# Patient Record
Sex: FEMALE | Race: BLACK OR AFRICAN AMERICAN | NOT HISPANIC OR LATINO
[De-identification: names, ages, dates, MRNs, and addresses within clinical notes are randomized per-mention and may not be internally consistent; named-entity substitution may affect disease eponyms.]

---

## 2020-06-25 ENCOUNTER — APPOINTMENT (OUTPATIENT)
Dept: OTOLARYNGOLOGY | Facility: CLINIC | Age: 40
End: 2020-06-25
Payer: COMMERCIAL

## 2020-06-25 VITALS
TEMPERATURE: 98.3 F | SYSTOLIC BLOOD PRESSURE: 160 MMHG | WEIGHT: 132 LBS | DIASTOLIC BLOOD PRESSURE: 111 MMHG | OXYGEN SATURATION: 95 % | HEIGHT: 64 IN | HEART RATE: 120 BPM | BODY MASS INDEX: 22.53 KG/M2

## 2020-06-25 DIAGNOSIS — Z86.79 PERSONAL HISTORY OF OTHER DISEASES OF THE CIRCULATORY SYSTEM: ICD-10-CM

## 2020-06-25 DIAGNOSIS — Z83.49 FAMILY HISTORY OF OTHER ENDOCRINE, NUTRITIONAL AND METABOLIC DISEASES: ICD-10-CM

## 2020-06-25 DIAGNOSIS — Z78.9 OTHER SPECIFIED HEALTH STATUS: ICD-10-CM

## 2020-06-25 DIAGNOSIS — Z82.49 FAMILY HISTORY OF ISCHEMIC HEART DISEASE AND OTHER DISEASES OF THE CIRCULATORY SYSTEM: ICD-10-CM

## 2020-06-25 DIAGNOSIS — Z87.09 PERSONAL HISTORY OF OTHER DISEASES OF THE RESPIRATORY SYSTEM: ICD-10-CM

## 2020-06-25 DIAGNOSIS — Z86.39 PERSONAL HISTORY OF OTHER ENDOCRINE, NUTRITIONAL AND METABOLIC DISEASE: ICD-10-CM

## 2020-06-25 DIAGNOSIS — Z82.3 FAMILY HISTORY OF STROKE: ICD-10-CM

## 2020-06-25 PROBLEM — Z00.00 ENCOUNTER FOR PREVENTIVE HEALTH EXAMINATION: Status: ACTIVE | Noted: 2020-06-25

## 2020-06-25 PROCEDURE — 31575 DIAGNOSTIC LARYNGOSCOPY: CPT

## 2020-06-25 PROCEDURE — 99205 OFFICE O/P NEW HI 60 MIN: CPT | Mod: 25

## 2020-06-25 PROCEDURE — 76536 US EXAM OF HEAD AND NECK: CPT

## 2020-06-25 RX ORDER — METOPROLOL TARTRATE 75 MG/1
TABLET, FILM COATED ORAL
Refills: 0 | Status: ACTIVE | COMMUNITY

## 2020-06-25 RX ORDER — AMLODIPINE BESYLATE 5 MG/1
TABLET ORAL
Refills: 0 | Status: ACTIVE | COMMUNITY

## 2020-07-19 ENCOUNTER — LABORATORY RESULT (OUTPATIENT)
Age: 40
End: 2020-07-19

## 2020-07-21 ENCOUNTER — TRANSCRIPTION ENCOUNTER (OUTPATIENT)
Age: 40
End: 2020-07-21

## 2020-07-21 VITALS
TEMPERATURE: 98 F | HEART RATE: 88 BPM | WEIGHT: 134.04 LBS | RESPIRATION RATE: 18 BRPM | OXYGEN SATURATION: 100 % | DIASTOLIC BLOOD PRESSURE: 94 MMHG | HEIGHT: 64 IN | SYSTOLIC BLOOD PRESSURE: 142 MMHG

## 2020-07-21 RX ORDER — ACETAMINOPHEN AND CODEINE 300; 30 MG/1; MG/1
300-30 TABLET ORAL
Qty: 12 | Refills: 0 | Status: COMPLETED | COMMUNITY
Start: 2020-07-21 | End: 1900-01-01

## 2020-07-22 ENCOUNTER — APPOINTMENT (OUTPATIENT)
Dept: OTOLARYNGOLOGY | Facility: HOSPITAL | Age: 40
End: 2020-07-22

## 2020-07-22 ENCOUNTER — RESULT REVIEW (OUTPATIENT)
Age: 40
End: 2020-07-22

## 2020-07-22 ENCOUNTER — OUTPATIENT (OUTPATIENT)
Dept: INPATIENT UNIT | Facility: HOSPITAL | Age: 40
LOS: 1 days | Discharge: HOME | End: 2020-07-22
Payer: COMMERCIAL

## 2020-07-22 VITALS
SYSTOLIC BLOOD PRESSURE: 125 MMHG | DIASTOLIC BLOOD PRESSURE: 70 MMHG | OXYGEN SATURATION: 100 % | RESPIRATION RATE: 16 BRPM

## 2020-07-22 LAB
ALBUMIN SERPL ELPH-MCNC: 4.5 G/DL — SIGNIFICANT CHANGE UP (ref 3.3–5)
BASOPHILS # BLD AUTO: 0.02 K/UL — SIGNIFICANT CHANGE UP (ref 0–0.2)
BASOPHILS NFR BLD AUTO: 0.5 % — SIGNIFICANT CHANGE UP (ref 0–2)
CALCIUM SERPL-MCNC: 10.4 MG/DL — SIGNIFICANT CHANGE UP (ref 8.4–10.5)
EOSINOPHIL # BLD AUTO: 0.2 K/UL — SIGNIFICANT CHANGE UP (ref 0–0.5)
EOSINOPHIL NFR BLD AUTO: 4.6 % — SIGNIFICANT CHANGE UP (ref 0–6)
HCT VFR BLD CALC: 35.4 % — SIGNIFICANT CHANGE UP (ref 34.5–45)
HGB BLD-MCNC: 10.4 G/DL — LOW (ref 11.5–15.5)
IMM GRANULOCYTES NFR BLD AUTO: 0.2 % — SIGNIFICANT CHANGE UP (ref 0–1.5)
LYMPHOCYTES # BLD AUTO: 1.52 K/UL — SIGNIFICANT CHANGE UP (ref 1–3.3)
LYMPHOCYTES # BLD AUTO: 34.9 % — SIGNIFICANT CHANGE UP (ref 13–44)
MAGNESIUM SERPL-MCNC: 2.1 MG/DL — SIGNIFICANT CHANGE UP (ref 1.6–2.6)
MCHC RBC-ENTMCNC: 23 PG — LOW (ref 27–34)
MCHC RBC-ENTMCNC: 29.4 GM/DL — LOW (ref 32–36)
MCV RBC AUTO: 78.3 FL — LOW (ref 80–100)
MONOCYTES # BLD AUTO: 0.51 K/UL — SIGNIFICANT CHANGE UP (ref 0–0.9)
MONOCYTES NFR BLD AUTO: 11.7 % — SIGNIFICANT CHANGE UP (ref 2–14)
NEUTROPHILS # BLD AUTO: 2.09 K/UL — SIGNIFICANT CHANGE UP (ref 1.8–7.4)
NEUTROPHILS NFR BLD AUTO: 48.1 % — SIGNIFICANT CHANGE UP (ref 43–77)
NRBC # BLD: 0 /100 WBCS — SIGNIFICANT CHANGE UP (ref 0–0)
PLATELET # BLD AUTO: 326 K/UL — SIGNIFICANT CHANGE UP (ref 150–400)
PTH-INTACT IO % DIF SERPL: 112 PG/ML — HIGH (ref 8.5–72.5)
PTH-INTACT IO % DIF SERPL: 13.1 PG/ML — SIGNIFICANT CHANGE UP (ref 8.5–72.5)
PTH-INTACT IO % DIF SERPL: 24.2 PG/ML — SIGNIFICANT CHANGE UP (ref 8.5–72.5)
PTH-INTACT IO % DIF SERPL: 41.5 PG/ML — SIGNIFICANT CHANGE UP (ref 8.5–72.5)
RBC # BLD: 4.52 M/UL — SIGNIFICANT CHANGE UP (ref 3.8–5.2)
RBC # FLD: 15.6 % — HIGH (ref 10.3–14.5)
WBC # BLD: 4.35 K/UL — SIGNIFICANT CHANGE UP (ref 3.8–10.5)
WBC # FLD AUTO: 4.35 K/UL — SIGNIFICANT CHANGE UP (ref 3.8–10.5)

## 2020-07-22 PROCEDURE — 60500 EXPLORE PARATHYROID GLANDS: CPT

## 2020-07-22 PROCEDURE — 36415 COLL VENOUS BLD VENIPUNCTURE: CPT

## 2020-07-22 PROCEDURE — 88305 TISSUE EXAM BY PATHOLOGIST: CPT | Mod: 26

## 2020-07-22 PROCEDURE — 82310 ASSAY OF CALCIUM: CPT

## 2020-07-22 PROCEDURE — 83970 ASSAY OF PARATHORMONE: CPT

## 2020-07-22 PROCEDURE — 83735 ASSAY OF MAGNESIUM: CPT

## 2020-07-22 PROCEDURE — 82040 ASSAY OF SERUM ALBUMIN: CPT

## 2020-07-22 PROCEDURE — 88305 TISSUE EXAM BY PATHOLOGIST: CPT

## 2020-07-22 PROCEDURE — 85025 COMPLETE CBC W/AUTO DIFF WBC: CPT

## 2020-07-22 RX ORDER — MORPHINE SULFATE 50 MG/1
2 CAPSULE, EXTENDED RELEASE ORAL EVERY 4 HOURS
Refills: 0 | Status: DISCONTINUED | OUTPATIENT
Start: 2020-07-22 | End: 2020-07-22

## 2020-07-22 RX ORDER — SODIUM CHLORIDE 9 MG/ML
1000 INJECTION, SOLUTION INTRAVENOUS
Refills: 0 | Status: DISCONTINUED | OUTPATIENT
Start: 2020-07-22 | End: 2020-07-22

## 2020-07-22 RX ORDER — OXYCODONE HYDROCHLORIDE 5 MG/1
5 TABLET ORAL ONCE
Refills: 0 | Status: DISCONTINUED | OUTPATIENT
Start: 2020-07-22 | End: 2020-07-22

## 2020-07-22 RX ORDER — ACETAMINOPHEN 500 MG
650 TABLET ORAL ONCE
Refills: 0 | Status: DISCONTINUED | OUTPATIENT
Start: 2020-07-22 | End: 2020-07-22

## 2020-07-22 RX ORDER — ONDANSETRON 8 MG/1
4 TABLET, FILM COATED ORAL ONCE
Refills: 0 | Status: DISCONTINUED | OUTPATIENT
Start: 2020-07-22 | End: 2020-07-22

## 2020-07-22 RX ADMIN — MORPHINE SULFATE 2 MILLIGRAM(S): 50 CAPSULE, EXTENDED RELEASE ORAL at 12:25

## 2020-07-22 NOTE — PROCEDURE
[Image(s) Captured] : image(s) captured and filed [Gag Reflex] : gag reflex preventing mirror examination [Unable to Cooperate with Mirror] : patient unable to cooperate with mirror [Serial Number: ___] : Serial Number: [unfilled] [FreeTextEntry3] : \par [facility]\par THYROID/NECK ULTRASOUND REPORT\par \par NAME: ADELE KIM .Sheridan..MR# [id number]..	   : 1980...	     DATE: 2020\par \par HISTORY/ INDICATIONS: A 39-year-old female with well documented primary hyperparathyroidism, a thyroid nodule and a possible parathyroid adenoma for pre-surgical imaging.  \par \par COMPARISON: None\par \par PROCEDURE: Physician performed high-resolution ultrasound gray scale imaging and color Doppler supplementation of the thyroid gland and neck was obtained in the longitudinal and transverse planes using a 13 MHz linear transducer with image capture.  All measurements are in centimeters (longitudinal x AP x transverse).  \par \par FINDINGS: Overall the thyroid gland is normal in size,  heterogeneous in echotexture with normal vascularity on color Doppler flow. \par \par RIGHT LOBE: Is not enlarged, heterogeneous, with normal vascularity on color Doppler and measures 4.22 x 1.38 x 1.60 cm.  NODULES: Within the mid right thyroid lobe there is a smoothly marginated complex primarily isoechoic nodule with central cystic degeneration and grade 2 vascularity that is wider than tall without microcalcifications and measures 1.78 x 0.80 x 1.25 cm, previously biopsied and benign. A second smoothly marginated hypoechoic nodule is identified anterior to the mid lobe dominant nodule with grade 1 vascularity that is wider than tall, has no microcalcifications that measures 0.40 x 0.17 x 0.34 cm.\par \par ISTHMUS: Measures 0.24 cm in AP dimension and is heterogeneous in echotexture with normal vascularity.  No nodules are identified.\par \par LEFT LOBE: Is not enlarged, heterogeneous, with normal vascularity on color Doppler and measures 3.82 x 1.22 x 1.14 cm. NODULES: None identified\par \par PARATHYROID GLANDS:  An oblong, hypoechoic structure with an echogenic line of separation that is highly vascular with several feeding vessels on color Doppler measures 1.52 x 0.53 x 1.32 cm just inferior to the left lower pole of the thyroid lobe. This is consistent with a parathyroid adenoma. There are no other identified enlarged parathyroid glands in the central neck compartment. \par \par LYMPH NODES: There are several benign appearing subcentimeter lymph nodes identified at neck levels II- III bilaterally, all with echogenic hilar lines, no calcifications or cystic degeneration and have a short long axis ratio < 0.5 in the transverse plane.\par \par IMPRESSION: A 39-year-old female with 2 identified nodules in the right thyroid lobe with benign characteristics and a 1.5 CM left inferior positioned parathyroid adenoma. 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 There are no masses or polyps and the nasal mucosa and secretions are normal. The choanae and posterior nasopharynx are normal without masses or drainage. The Eustachian tube orifices appear patent. The pharynx, including the posterior and lateral pharyngeal walls, the vallecula and base of tongue are normal without ulcerations, lesions or masses. The hypopharynx including the pyriform sinuses open well without pooling of secretions, mucosal lesions or masses. The supraglottic larynx including the epiglottis, petiole, arytenoids, glossoepiglottic, aryepiglottic and pharyngoepiglottic folds are normal without mucosal lesions, ulcerations or masses. The glottis reveals normal false vocal folds. The true vocal folds are glistening white, tense and of equal length, without paralysis, having symmetric mobility on adduction and abduction. There are no mucosal lesions, nodules, cysts, erythroplasia or leukoplakia. The posterior cricoid area has healthy pink mucosa in the interarytenoid area and esophageal inlet. There is no thickening/edema of the interarytenoid mucosa suggestive of posterior laryngitis from laryngopharyngeal acid reflux disease. The trachea is clear without narrowing in the immediate subglottic region, without deviation or lesions. \par  [de-identified] : preoperative assessment

## 2020-07-22 NOTE — PROCEDURE
[Image(s) Captured] : image(s) captured and filed [Serial Number: ___] : Serial Number: [unfilled] [Unable to Cooperate with Mirror] : patient unable to cooperate with mirror [Gag Reflex] : gag reflex preventing mirror examination [FreeTextEntry3] : \par [facility]\par THYROID/NECK ULTRASOUND REPORT\par \par NAME: ADELE KIM .Sheridan..MR# [id number]..	   : 1980...	     DATE: 2020\par \par HISTORY/ INDICATIONS: A 39-year-old female with well documented primary hyperparathyroidism, a thyroid nodule and a possible parathyroid adenoma for pre-surgical imaging.  \par \par COMPARISON: None\par \par PROCEDURE: Physician performed high-resolution ultrasound gray scale imaging and color Doppler supplementation of the thyroid gland and neck was obtained in the longitudinal and transverse planes using a 13 MHz linear transducer with image capture.  All measurements are in centimeters (longitudinal x AP x transverse).  \par \par FINDINGS: Overall the thyroid gland is normal in size,  heterogeneous in echotexture with normal vascularity on color Doppler flow. \par \par RIGHT LOBE: Is not enlarged, heterogeneous, with normal vascularity on color Doppler and measures 4.22 x 1.38 x 1.60 cm.  NODULES: Within the mid right thyroid lobe there is a smoothly marginated complex primarily isoechoic nodule with central cystic degeneration and grade 2 vascularity that is wider than tall without microcalcifications and measures 1.78 x 0.80 x 1.25 cm, previously biopsied and benign. A second smoothly marginated hypoechoic nodule is identified anterior to the mid lobe dominant nodule with grade 1 vascularity that is wider than tall, has no microcalcifications that measures 0.40 x 0.17 x 0.34 cm.\par \par ISTHMUS: Measures 0.24 cm in AP dimension and is heterogeneous in echotexture with normal vascularity.  No nodules are identified.\par \par LEFT LOBE: Is not enlarged, heterogeneous, with normal vascularity on color Doppler and measures 3.82 x 1.22 x 1.14 cm. NODULES: None identified\par \par PARATHYROID GLANDS:  An oblong, hypoechoic structure with an echogenic line of separation that is highly vascular with several feeding vessels on color Doppler measures 1.52 x 0.53 x 1.32 cm just inferior to the left lower pole of the thyroid lobe. This is consistent with a parathyroid adenoma. There are no other identified enlarged parathyroid glands in the central neck compartment. \par \par LYMPH NODES: There are several benign appearing subcentimeter lymph nodes identified at neck levels II- III bilaterally, all with echogenic hilar lines, no calcifications or cystic degeneration and have a short long axis ratio < 0.5 in the transverse plane.\par \par IMPRESSION: A 39-year-old female with 2 identified nodules in the right thyroid lobe with benign characteristics and a 1.5 CM left inferior positioned parathyroid adenoma. 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 There are no masses or polyps and the nasal mucosa and secretions are normal. The choanae and posterior nasopharynx are normal without masses or drainage. The Eustachian tube orifices appear patent. The pharynx, including the posterior and lateral pharyngeal walls, the vallecula and base of tongue are normal without ulcerations, lesions or masses. The hypopharynx including the pyriform sinuses open well without pooling of secretions, mucosal lesions or masses. The supraglottic larynx including the epiglottis, petiole, arytenoids, glossoepiglottic, aryepiglottic and pharyngoepiglottic folds are normal without mucosal lesions, ulcerations or masses. The glottis reveals normal false vocal folds. The true vocal folds are glistening white, tense and of equal length, without paralysis, having symmetric mobility on adduction and abduction. There are no mucosal lesions, nodules, cysts, erythroplasia or leukoplakia. The posterior cricoid area has healthy pink mucosa in the interarytenoid area and esophageal inlet. There is no thickening/edema of the interarytenoid mucosa suggestive of posterior laryngitis from laryngopharyngeal acid reflux disease. The trachea is clear without narrowing in the immediate subglottic region, without deviation or lesions. \par  [de-identified] : preoperative assessment

## 2020-07-22 NOTE — CONSULT LETTER
[Consult Letter:] : I had the pleasure of evaluating your patient, [unfilled]. [Dear  ___] : Dear  [unfilled], [Please see my note below.] : Please see my note below. [Sincerely,] : Sincerely, [DrSheridan  ___] : Dr. YATES [Consult Closing:] : Thank you very much for allowing me to participate in the care of this patient.  If you have any questions, please do not hesitate to contact me. [FreeTextEntry3] : \par Binu Vázquez M.D., FACS, ECNU\par Director Center for Thyroid & Parathyroid Surgery\par [facility]\par Certified in Thyroid/Parathyroid/Neck Ultrasound, ECNU/ AIUM\par \par , Department of Otolaryngology\par [facility] [employer] at [facility]\par

## 2020-07-22 NOTE — PROGRESS NOTE ADULT - SUBJECTIVE AND OBJECTIVE BOX
POST-OPERATIVE NOTE    Procedure: Left inferior parathyroidectomy    Diagnosis/Indication:     Surgeon:    S: Pt has no complaints. Denies CP, SOB, SANTANA, calf tenderness. Pain controlled with medication. Denies nausea, vomiting.    O:  T(C): 36.6 (07-22-20 @ 11:31), Max: 36.6 (07-22-20 @ 11:31)  T(F): 97.9 (07-22-20 @ 11:31), Max: 97.9 (07-22-20 @ 11:31)  HR: 72 (07-22-20 @ 14:00) (68 - 78)  BP: 128/85 (07-22-20 @ 14:00) (126/76 - 136/75)  RR: 16 (07-22-20 @ 14:00) (12 - 16)  SpO2: 100% (07-22-20 @ 14:00) (100% - 100%)  Wt(kg): --                        10.4   4.35  )-----------( 326      ( 22 Jul 2020 07:55 )             35.4             Gen: NAD, resting comfortably in bed  C/V: NSR  Pulm: Nonlabored breathing, no respiratory distress  Abd: soft, NT/ND  Extrem: WWP, no calf edema or tenderness, SCDs in place      A/P: 39y Female s/p above procedure  Diet:  IVF:  Pain/nausea control  SQH/SCDs/OOBA/IS  Dispo pending pain control, PO tolerance, clinical improvement

## 2020-07-22 NOTE — HISTORY OF PRESENT ILLNESS
[de-identified] : Shefali is a general healthy 39-year-old  with well-documented primary hyperparathyroidism first formally diagnosed last year but with known hypercalcemia in the 11 mg/dl range since 2013.  In July of 2019 she had a calcium level at 11.4 mg/dl with an associated intact PTH of 76.1 pg/ml and then in May of this year her calcium level was measured at 11.0 mg/dl with a PTH at 63.9 pg/ml.  Her vitamin D 25-OH total level was normal. She had a normal bone DEXA study.  A thyroid ultrasound was obtained that was consistent with a left inferior positioned parathyroid adenoma measuring 11 mm.  A right mid-lobe complex thyroid nodule was identified measuring 1.6 CM and an FNA biopsy last August was reported as benign, [place] category II.  She is euthyroid.  A 24-hour urine collection revealed an elevated calcium level at 286 mg per 24 hours. However, she has not had any history of nephrolithiasis and her renal function is normal (GFR >100). Shefali denies recent shortness of breath, voice changes, dysphagia, anterior neck pain, neck pressure or mass. There is no family history of thyroid cancer. She denies any known radiation exposures in her youth.  She denies calcium, vitamin D supplements, HCTZ or past use of Lithium Carbonate. There is no family history of nephrolithiasis or renal disease but her mother currently has primary hyperparathyroidism being observed.  There is no history of fragility bone fractures. Other than fatigue, muscle weakness, generalized bone aches, joint pain, depression, memory loss, brain fog, abdominal pain, occasional constipation, polyuria and polydipsia she denies peptic ulcer disease or pancreatitis. She had GERD in the past managed with Nexium but not needed over the past 6-9 months. She has alopecia areata of uncertain etiology. She was referred for a surgical consultation.\par

## 2020-07-22 NOTE — REASON FOR VISIT
[FreeTextEntry2] : primary hyperparathyroidism, hypercalcemia, and a possible parathyroid adenoma for surgical consultation. [FreeTextEntry1] : Referred by Domi Sawyer MD Endocrinologist, PCP is Karolina Lee MD

## 2020-07-22 NOTE — BRIEF OPERATIVE NOTE - OPERATION/FINDINGS
left inferior parathyroidectomy, L RLN and L vagus identified  pre-op , post resection PTH 5 minute = 41.5, 10 minute = 24.2

## 2020-07-22 NOTE — CONSULT LETTER
[Consult Letter:] : I had the pleasure of evaluating your patient, [unfilled]. [Please see my note below.] : Please see my note below. [Dear  ___] : Dear  [unfilled], [Sincerely,] : Sincerely, [DrSheridan  ___] : Dr. YATES [Consult Closing:] : Thank you very much for allowing me to participate in the care of this patient.  If you have any questions, please do not hesitate to contact me. [FreeTextEntry3] : \par Binu Vázquez M.D., FACS, ECNU\par Director Center for Thyroid & Parathyroid Surgery\par [facility]\par Certified in Thyroid/Parathyroid/Neck Ultrasound, ECNU/ AIUM\par \par , Department of Otolaryngology\par [facility] [employer] [facility] at [facility]\par

## 2020-07-22 NOTE — HISTORY OF PRESENT ILLNESS
[de-identified] : Shefali is a general healthy 39-year-old  with well-documented primary hyperparathyroidism first formally diagnosed last year but with known hypercalcemia in the 11 mg/dl range since 2013.  In July of 2019 she had a calcium level at 11.4 mg/dl with an associated intact PTH of 76.1 pg/ml and then in May of this year her calcium level was measured at 11.0 mg/dl with a PTH at 63.9 pg/ml.  Her vitamin D 25-OH total level was normal. She had a normal bone DEXA study.  A thyroid ultrasound was obtained that was consistent with a left inferior positioned parathyroid adenoma measuring 11 mm.  A right mid-lobe complex thyroid nodule was identified measuring 1.6 CM and an FNA biopsy last August was reported as benign, [place] category II.  She is euthyroid.  A 24-hour urine collection revealed an elevated calcium level at 286 mg per 24 hours. However, she has not had any history of nephrolithiasis and her renal function is normal (GFR >100). Shefali denies recent shortness of breath, voice changes, dysphagia, anterior neck pain, neck pressure or mass. There is no family history of thyroid cancer. She denies any known radiation exposures in her youth.  She denies calcium, vitamin D supplements, HCTZ or past use of Lithium Carbonate. There is no family history of nephrolithiasis or renal disease but her mother currently has primary hyperparathyroidism being observed.  There is no history of fragility bone fractures. Other than fatigue, muscle weakness, generalized bone aches, joint pain, depression, memory loss, brain fog, abdominal pain, occasional constipation, polyuria and polydipsia she denies peptic ulcer disease or pancreatitis. She had GERD in the past managed with Nexium but not needed over the past 6-9 months. She has alopecia areata of uncertain etiology. She was referred for a surgical consultation.\par

## 2020-07-24 PROBLEM — I10 ESSENTIAL (PRIMARY) HYPERTENSION: Chronic | Status: ACTIVE | Noted: 2020-07-21

## 2020-07-24 PROBLEM — J45.909 UNSPECIFIED ASTHMA, UNCOMPLICATED: Chronic | Status: ACTIVE | Noted: 2020-07-21

## 2020-07-24 LAB — SURGICAL PATHOLOGY STUDY: SIGNIFICANT CHANGE UP

## 2020-07-24 RX ORDER — AZITHROMYCIN 500 MG/1
500 TABLET, FILM COATED ORAL DAILY
Qty: 1 | Refills: 0 | Status: ACTIVE | COMMUNITY
Start: 2020-07-21 | End: 1900-01-01

## 2020-07-24 RX ORDER — ACETAMINOPHEN AND CODEINE 300; 30 MG/1; MG/1
300-30 TABLET ORAL
Qty: 12 | Refills: 0 | Status: COMPLETED | COMMUNITY
Start: 2020-07-21 | End: 1900-01-01

## 2020-07-27 ENCOUNTER — APPOINTMENT (OUTPATIENT)
Dept: OTOLARYNGOLOGY | Facility: CLINIC | Age: 40
End: 2020-07-27
Payer: COMMERCIAL

## 2020-07-27 VITALS
OXYGEN SATURATION: 100 % | RESPIRATION RATE: 14 BRPM | DIASTOLIC BLOOD PRESSURE: 96 MMHG | SYSTOLIC BLOOD PRESSURE: 146 MMHG | TEMPERATURE: 98.3 F | HEART RATE: 82 BPM

## 2020-07-27 PROCEDURE — 31575 DIAGNOSTIC LARYNGOSCOPY: CPT | Mod: 58

## 2020-07-27 PROCEDURE — 99024 POSTOP FOLLOW-UP VISIT: CPT

## 2020-07-28 LAB
ALBUMIN SERPL ELPH-MCNC: 4.6 G/DL
ALP BLD-CCNC: 64 U/L
ALT SERPL-CCNC: 6 U/L
ANION GAP SERPL CALC-SCNC: 12 MMOL/L
AST SERPL-CCNC: 16 U/L
BILIRUB SERPL-MCNC: 0.3 MG/DL
BUN SERPL-MCNC: 12 MG/DL
CALCIUM SERPL-MCNC: 10.1 MG/DL
CALCIUM SERPL-MCNC: 10.1 MG/DL
CHLORIDE SERPL-SCNC: 101 MMOL/L
CO2 SERPL-SCNC: 26 MMOL/L
CREAT SERPL-MCNC: 0.68 MG/DL
GLUCOSE SERPL-MCNC: 94 MG/DL
PARATHYROID HORMONE INTACT: 26 PG/ML
POTASSIUM SERPL-SCNC: 5 MMOL/L
PROT SERPL-MCNC: 7.3 G/DL
SODIUM SERPL-SCNC: 139 MMOL/L

## 2020-08-11 ENCOUNTER — TRANSCRIPTION ENCOUNTER (OUTPATIENT)
Age: 40
End: 2020-08-11

## 2020-09-21 ENCOUNTER — APPOINTMENT (OUTPATIENT)
Dept: OTOLARYNGOLOGY | Facility: CLINIC | Age: 40
End: 2020-09-21
Payer: COMMERCIAL

## 2020-09-21 VITALS
OXYGEN SATURATION: 95 % | DIASTOLIC BLOOD PRESSURE: 101 MMHG | SYSTOLIC BLOOD PRESSURE: 159 MMHG | HEART RATE: 78 BPM | TEMPERATURE: 98.3 F

## 2020-09-21 PROCEDURE — 99024 POSTOP FOLLOW-UP VISIT: CPT

## 2020-09-21 RX ORDER — AMLODIPINE BESYLATE 10 MG/1
10 TABLET ORAL
Qty: 30 | Refills: 0 | Status: ACTIVE | COMMUNITY
Start: 2020-04-30

## 2020-09-21 RX ORDER — LISINOPRIL 40 MG/1
40 TABLET ORAL
Qty: 30 | Refills: 0 | Status: ACTIVE | COMMUNITY
Start: 2020-04-30

## 2020-09-21 NOTE — HISTORY OF PRESENT ILLNESS
[de-identified] : Shefali is a general healthy 39-year-old  with well-documented primary hyperparathyroidism first formally diagnosed last year but with known hypercalcemia in the 11 mg/dl range since 2013.  In July of 2019 she had a calcium level at 11.4 mg/dl with an associated intact PTH of 76.1 pg/ml and then in May of this year her calcium level was measured at 11.0 mg/dl with a PTH at 63.9 pg/ml.  Her vitamin D 25-OH total level was normal. She had a normal bone DEXA study.  A thyroid ultrasound was obtained that was consistent with a left inferior positioned parathyroid adenoma measuring 11 mm.  A right mid-lobe complex thyroid nodule was identified measuring 1.6 CM and an FNA biopsy last August was reported as benign, [place] category II.  She is euthyroid.  A 24-hour urine collection revealed an elevated calcium level at 286 mg per 24 hours. However, she has not had any history of nephrolithiasis and her renal function is normal (GFR >100). Shefali denies recent shortness of breath, voice changes, dysphagia, anterior neck pain, neck pressure or mass. There is no family history of thyroid cancer. She denies any known radiation exposures in her youth.  She denies calcium, vitamin D supplements, HCTZ or past use of Lithium Carbonate. There is no family history of nephrolithiasis or renal disease but her mother currently has primary hyperparathyroidism being observed.  There is no history of fragility bone fractures. Other than fatigue, muscle weakness, generalized bone aches, joint pain, depression, memory loss, brain fog, abdominal pain, occasional constipation, polyuria and polydipsia she denies peptic ulcer disease or pancreatitis. She had GERD in the past managed with Nexium but not needed over the past 6-9 months. She has alopecia areata of uncertain etiology. She was referred for a surgical consultation.\par  [FreeTextEntry1] : Shefali returns for her first postoperative visit after an uncomplicated neck exploration and parathyroidectomy on 07/22/2020.  She is minimally hoarse since surgery and currently denies paresthesias. She is tolerating a regular diet. She has been taking 2 Citracal tablets BID. Surgical pathology confirmed a 1.2 CM, 1 g enlarged left inferior parathyroid gland consistent with a parathyroid adenoma.  At the time of surgery she had a normal-appearing left upper parathyroid gland and had an appropriate drop in her intraoperative parathyroid hormone assay greater than 75% from the baseline level.

## 2020-09-21 NOTE — HISTORY OF PRESENT ILLNESS
[de-identified] : Shefali is a general healthy 39-year-old  with well-documented primary hyperparathyroidism first formally diagnosed last year but with known hypercalcemia in the 11 mg/dl range since 2013.  In July of 2019 she had a calcium level at 11.4 mg/dl with an associated intact PTH of 76.1 pg/ml and then in May of this year her calcium level was measured at 11.0 mg/dl with a PTH at 63.9 pg/ml.  Her vitamin D 25-OH total level was normal. She had a normal bone DEXA study.  A thyroid ultrasound was obtained that was consistent with a left inferior positioned parathyroid adenoma measuring 11 mm.  A right mid-lobe complex thyroid nodule was identified measuring 1.6 CM and an FNA biopsy last August was reported as benign, [place] category II.  She is euthyroid.  A 24-hour urine collection revealed an elevated calcium level at 286 mg per 24 hours. However, she has not had any history of nephrolithiasis and her renal function is normal (GFR >100). Shefali denies recent shortness of breath, voice changes, dysphagia, anterior neck pain, neck pressure or mass. There is no family history of thyroid cancer. She denies any known radiation exposures in her youth.  She denies calcium, vitamin D supplements, HCTZ or past use of Lithium Carbonate. There is no family history of nephrolithiasis or renal disease but her mother currently has primary hyperparathyroidism being observed.  There is no history of fragility bone fractures. Other than fatigue, muscle weakness, generalized bone aches, joint pain, depression, memory loss, brain fog, abdominal pain, occasional constipation, polyuria and polydipsia she denies peptic ulcer disease or pancreatitis. She had GERD in the past managed with Nexium but not needed over the past 6-9 months. She has alopecia areata of uncertain etiology. She was referred for a surgical consultation.\par  [FreeTextEntry1] : Shefali returns for a follow up visit after an uncomplicated neck exploration and parathyroidectomy on 07/22/2020.  She is less hoarse since surgery.  Surgical pathology confirmed a 1.2 CM, 1 g enlarged left inferior parathyroid gland consistent with a parathyroid adenoma.  At the time of surgery she had a normal-appearing left upper parathyroid gland and had an appropriate drop in her intraoperative parathyroid hormone assay greater than 75% from the baseline level.  Her postop Ca/PTH were 10.1/26.  She is still complaining of fatigue now thought to be due to iron deficiency anemia. Follow up labs with Dr. Sawyer revealed an Fe deficiency anemia.  She is now on both iron and vitamin B12 supplements. She was advised to take vitamin D3 2,000 IU daily.

## 2020-09-21 NOTE — PHYSICAL EXAM
[Normal] : no mass and no adenopathy [de-identified] : The neck incision is healing well without signs of hypertrophy or keloid formation.

## 2020-09-21 NOTE — PROCEDURE
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 There are no masses or polyps and the nasal mucosa and secretions are normal. The choanae and posterior nasopharynx are normal without masses or drainage. The Eustachian tube orifices appear patent. The pharynx, including the posterior and lateral pharyngeal walls, the vallecula and base of tongue are normal without ulcerations, lesions or masses. The hypopharynx including the pyriform sinuses open well without pooling of secretions, mucosal lesions or masses. The supraglottic larynx including the epiglottis, petiole, arytenoids, glossoepiglottic, aryepiglottic and pharyngoepiglottic folds are normal without mucosal lesions, ulcerations or masses. The glottis reveals normal false vocal folds. The true vocal folds are hyperemic but tense and of equal length, without paralysis, having symmetric mobility on adduction and abduction. There are no mucosal lesions, nodules, cysts, erythroplasia or leukoplakia. The posterior cricoid area has healthy pink mucosa in the interarytenoid area and esophageal inlet. There is no thickening/edema of the interarytenoid mucosa suggestive of posterior laryngitis from laryngopharyngeal acid reflux disease. The trachea is clear without narrowing in the immediate subglottic region, without deviation or lesions. \par  [de-identified] : postoperative assessment

## 2020-09-21 NOTE — REASON FOR VISIT
[FreeTextEntry2] : a first postop visit after parathyroidectomy for primary hyperparathyroidism, hypercalcemia, and a possible parathyroid adenoma for surgical consultation. [FreeTextEntry1] : Referred by Domi Sawyer MD Endocrinologist, PCP is Karolina Lee MD

## 2020-09-21 NOTE — CONSULT LETTER
[Dear  ___] : Dear  [unfilled], [Consult Letter:] : I had the pleasure of evaluating your patient, [unfilled]. [Please see my note below.] : Please see my note below. [Consult Closing:] : Thank you very much for allowing me to participate in the care of this patient.  If you have any questions, please do not hesitate to contact me. [Sincerely,] : Sincerely, [FreeTextEntry3] : \par Binu Vázquez M.D., FACS, ECNU\par Director Center for Thyroid & Parathyroid Surgery\par [facility] [place] at [facility]\par Certified in Thyroid/Parathyroid/Neck Ultrasound, ECNU/ AIUM\par \par , Department of Otolaryngology\par [facility] [employer] [facility] at [facility]\par

## 2020-09-21 NOTE — CONSULT LETTER
[FreeTextEntry3] : \par Binu Vázquez M.D., FACS, ECNU\par Director Center for Thyroid & Parathyroid Surgery\par [facility] [place] at [facility]\par Certified in Thyroid/Parathyroid/Neck Ultrasound, ECNU/ AIUM\par \par , Department of Otolaryngology\par [facility] [employer] [facility] at [facility]\par

## 2020-09-21 NOTE — PHYSICAL EXAM
[de-identified] : The neck is flat without seroma or hematoma. The subcuticular suture was removed. The voice is raspy.  A [place] sign was not present.

## 2020-09-21 NOTE — REASON FOR VISIT
[FreeTextEntry2] : a followup visit after parathyroidectomy for primary hyperparathyroidism, hypercalcemia, and a possible parathyroid adenoma for surgical consultation. [FreeTextEntry1] : Referred by Domi Sawyer MD Endocrinologist, PCP is Karolina Lee MD

## 2020-12-15 ENCOUNTER — APPOINTMENT (OUTPATIENT)
Dept: OTOLARYNGOLOGY | Facility: CLINIC | Age: 40
End: 2020-12-15
Payer: COMMERCIAL

## 2020-12-15 VITALS — SYSTOLIC BLOOD PRESSURE: 158 MMHG | DIASTOLIC BLOOD PRESSURE: 102 MMHG

## 2020-12-15 VITALS
HEART RATE: 77 BPM | OXYGEN SATURATION: 100 % | DIASTOLIC BLOOD PRESSURE: 102 MMHG | TEMPERATURE: 97.8 F | RESPIRATION RATE: 17 BRPM | SYSTOLIC BLOOD PRESSURE: 150 MMHG

## 2020-12-15 DIAGNOSIS — D50.0 IRON DEFICIENCY ANEMIA SECONDARY TO BLOOD LOSS (CHRONIC): ICD-10-CM

## 2020-12-15 PROCEDURE — 31575 DIAGNOSTIC LARYNGOSCOPY: CPT

## 2020-12-15 PROCEDURE — 99072 ADDL SUPL MATRL&STAF TM PHE: CPT

## 2020-12-15 PROCEDURE — 99214 OFFICE O/P EST MOD 30 MIN: CPT | Mod: 25

## 2020-12-15 NOTE — PROCEDURE
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 There are no masses or polyps and the nasal mucosa and secretions are normal. The choanae and posterior nasopharynx are normal but with thick cloudy drainage. The Eustachian tube orifices appear patent. The pharynx, including the posterior and lateral pharyngeal walls, the vallecula and base of tongue are normal without ulcerations, lesions or masses. The hypopharynx including the pyriform sinuses open well without pooling of secretions, mucosal lesions or masses. The supraglottic larynx including the epiglottis, petiole, arytenoids, glossoepiglottic, aryepiglottic and pharyngoepiglottic folds are normal without mucosal lesions, ulcerations or masses. The glottis reveals normal false vocal folds. The true vocal folds are glistening white, tense and of equal length, without paralysis, having symmetric mobility on adduction and abduction. There are no mucosal lesions, nodules, cysts, erythroplasia or leukoplakia. The posterior cricoid area has healthy pink mucosa in the interarytenoid area and esophageal inlet. There is no thickening/edema of the interarytenoid mucosa suggestive of posterior laryngitis from laryngopharyngeal acid reflux disease. The trachea is clear without narrowing in the immediate subglottic region, without deviation or lesions. \par  [de-identified] : thyroid nodule, post nasal drip x 1 week

## 2020-12-15 NOTE — HISTORY OF PRESENT ILLNESS
[de-identified] : Shefali is a general healthy 39-year-old  with well-documented primary hyperparathyroidism first formally diagnosed last year but with known hypercalcemia in the 11 mg/dl range since 2013.  In July of 2019 she had a calcium level at 11.4 mg/dl with an associated intact PTH of 76.1 pg/ml and then in May of this year her calcium level was measured at 11.0 mg/dl with a PTH at 63.9 pg/ml.  Her vitamin D 25-OH total level was normal. She had a normal bone DEXA study.  A thyroid ultrasound was obtained that was consistent with a left inferior positioned parathyroid adenoma measuring 11 mm.  A right mid-lobe complex thyroid nodule was identified measuring 1.6 CM and an FNA biopsy last August was reported as benign, [place] category II.  She is euthyroid.  A 24-hour urine collection revealed an elevated calcium level at 286 mg per 24 hours. However, she has not had any history of nephrolithiasis and her renal function is normal (GFR >100). Shefali denies recent shortness of breath, voice changes, dysphagia, anterior neck pain, neck pressure or mass. There is no family history of thyroid cancer. She denies any known radiation exposures in her youth.  She denies calcium, vitamin D supplements, HCTZ or past use of Lithium Carbonate. There is no family history of nephrolithiasis or renal disease but her mother currently has primary hyperparathyroidism being observed.  There is no history of fragility bone fractures. Other than fatigue, muscle weakness, generalized bone aches, joint pain, depression, memory loss, brain fog, abdominal pain, occasional constipation, polyuria and polydipsia she denies peptic ulcer disease or pancreatitis. She had GERD in the past managed with Nexium but not needed over the past 6-9 months. She has alopecia areata of uncertain etiology. She was referred for a surgical consultation.\par  [FreeTextEntry1] : Shefali returns for a follow up visit after an uncomplicated neck exploration and parathyroidectomy on 07/22/2020.  She is less hoarse since surgery. She has had a PND for the past week and suffers from poor sleep and a very stressful job.  Her BP is elevayed today in the office but did not take her BP meds this am. Surgical pathology confirmed a 1.2 CM, 1 g enlarged left inferior parathyroid gland consistent with a parathyroid adenoma.  At the time of surgery she had a normal-appearing left upper parathyroid gland and had an appropriate drop in her intraoperative parathyroid hormone assay greater than 75% from the baseline level.  Her postop Ca/PTH were 10.1/26. Repeat labs last month Ca 10.0, iPTH 21.9 pg/ml.  Vit D 25-OH total 26.4. TFTs normal. She is slightly anemic now on FeSO4 for heavy menstruation to be monitored. She is still complaining of fatigue now thought to be due to iron deficiency anemia. She was advised to take vitamin D3 2,000 IU daily.  A right mid-lower pole thyroid nodule was benign on FNAB 1.6 cm and can be monitored. She denies fever, body aches, cough, cyanosis, chest burning, anosmia or recent known COVID exposures.  All family members at home are well.

## 2020-12-15 NOTE — CONSULT LETTER
[Dear  ___] : Dear  [unfilled], [Consult Letter:] : I had the pleasure of evaluating your patient, [unfilled]. [Please see my note below.] : Please see my note below. [Consult Closing:] : Thank you very much for allowing me to participate in the care of this patient.  If you have any questions, please do not hesitate to contact me. [Sincerely,] : Sincerely, [FreeTextEntry3] : \par Binu Vázquez M.D., FACS, ECNU\par Director Center for Thyroid & Parathyroid Surgery\par [facility] [place] at [facility]\par Certified in Thyroid/Parathyroid/Neck Ultrasound, ECNU/ AIUM\par \par , Department of Otolaryngology\par [facility] at [facility]\par

## 2021-01-20 ENCOUNTER — TRANSCRIPTION ENCOUNTER (OUTPATIENT)
Age: 41
End: 2021-01-20

## 2021-10-17 ENCOUNTER — TRANSCRIPTION ENCOUNTER (OUTPATIENT)
Age: 41
End: 2021-10-17

## 2021-12-14 ENCOUNTER — APPOINTMENT (OUTPATIENT)
Dept: OTOLARYNGOLOGY | Facility: CLINIC | Age: 41
End: 2021-12-14

## 2022-01-20 ENCOUNTER — APPOINTMENT (OUTPATIENT)
Dept: OTOLARYNGOLOGY | Facility: CLINIC | Age: 42
End: 2022-01-20
Payer: COMMERCIAL

## 2022-01-20 VITALS
SYSTOLIC BLOOD PRESSURE: 143 MMHG | BODY MASS INDEX: 23.05 KG/M2 | TEMPERATURE: 98.2 F | OXYGEN SATURATION: 100 % | HEIGHT: 64 IN | DIASTOLIC BLOOD PRESSURE: 91 MMHG | HEART RATE: 108 BPM | WEIGHT: 135 LBS

## 2022-01-20 PROCEDURE — 99214 OFFICE O/P EST MOD 30 MIN: CPT | Mod: 25

## 2022-01-20 PROCEDURE — 31575 DIAGNOSTIC LARYNGOSCOPY: CPT

## 2022-01-20 NOTE — HISTORY OF PRESENT ILLNESS
[de-identified] : Shefali is a general healthy 39-year-old  with well-documented primary hyperparathyroidism first formally diagnosed last year but with known hypercalcemia in the 11 mg/dl range since 2013.  In July of 2019 she had a calcium level at 11.4 mg/dl with an associated intact PTH of 76.1 pg/ml and then in May of this year her calcium level was measured at 11.0 mg/dl with a PTH at 63.9 pg/ml.  Her vitamin D 25-OH total level was normal. She had a normal bone DEXA study.  A thyroid ultrasound was obtained that was consistent with a left inferior positioned parathyroid adenoma measuring 11 mm.  A right mid-lobe complex thyroid nodule was identified measuring 1.6 CM and an FNA biopsy last August was reported as benign, [place] category II.  She is euthyroid.  A 24-hour urine collection revealed an elevated calcium level at 286 mg per 24 hours. However, she has not had any history of nephrolithiasis and her renal function is normal (GFR >100). Shefali denies recent shortness of breath, voice changes, dysphagia, anterior neck pain, neck pressure or mass. There is no family history of thyroid cancer. She denies any known radiation exposures in her youth.  She denies calcium, vitamin D supplements, HCTZ or past use of Lithium Carbonate. There is no family history of nephrolithiasis or renal disease but her mother currently has primary hyperparathyroidism being observed.  There is no history of fragility bone fractures. Other than fatigue, muscle weakness, generalized bone aches, joint pain, depression, memory loss, brain fog, abdominal pain, occasional constipation, polyuria and polydipsia she denies peptic ulcer disease or pancreatitis. She had GERD in the past managed with Nexium but not needed over the past 6-9 months. She has alopecia areata of uncertain etiology. She was referred for a surgical consultation.\par  [FreeTextEntry1] : Shefali returns for a follow up visit after an uncomplicated neck exploration and parathyroidectomy on 07/22/2020. Her voice is back to baseline. She has COVID infection last month and had mild symptoms but still some fatigue and brain fog. Surgical pathology confirmed a 1.2 CM, 1 g enlarged left inferior parathyroid gland consistent with a parathyroid adenoma.  At the time of surgery she had a normal-appearing left upper parathyroid gland and had an appropriate drop in her intraoperative parathyroid hormone assay greater than 75% from the baseline level.  Her postop Ca/PTH were 10.1/26. Repeat labs were recently sent by her PCP.  She is not taking vitamin D3.  TFTs normal. She is still slightly anemic now on FeSO4 for heavy menstruation to be monitored.  A right mid-lower pole thyroid nodule was benign on FNAB 1.6 cm and can be monitored. She has some new stiffness in the neck.  She denies fever, body aches, cough, cyanosis, dysphagia, chest burning, anosmia or recent known COVID exposures.  All family members at home are well.  She is vaccinated but not boosted yet.  She also complains of dry itchy eyes.

## 2022-01-20 NOTE — PROCEDURE
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 There are no masses or polyps and the nasal mucosa and secretions are normal. The choanae and posterior nasopharynx are normal but with prominent lymphoid hyperplasia without mass.  The Eustachian tube orifices appear patent. The pharynx, including the posterior and lateral pharyngeal walls, the vallecula and base of tongue are normal without ulcerations, lesions or masses. The hypopharynx including the pyriform sinuses open well without pooling of secretions, mucosal lesions or masses. The supraglottic larynx including the epiglottis, petiole, arytenoids, glossoepiglottic, aryepiglottic and pharyngoepiglottic folds are normal without mucosal lesions, ulcerations or masses. The glottis reveals normal false vocal folds. The true vocal folds are glistening white, tense and of equal length, without paralysis, having symmetric mobility on adduction and abduction. There are no mucosal lesions, nodules, cysts, erythroplasia or leukoplakia. The posterior cricoid area has healthy pink mucosa in the interarytenoid area and esophageal inlet. There is mild thickening/edema of the interarytenoid mucosa suggestive of posterior laryngitis from laryngopharyngeal acid reflux disease. The trachea is clear without narrowing in the immediate subglottic region, without deviation or lesions. \par  [de-identified] : thyroid nodule s/p parathyroidectomy.

## 2022-01-20 NOTE — CONSULT LETTER
[Dear  ___] : Dear  [unfilled], [Consult Letter:] : I had the pleasure of evaluating your patient, [unfilled]. [Please see my note below.] : Please see my note below. [Consult Closing:] : Thank you very much for allowing me to participate in the care of this patient.  If you have any questions, please do not hesitate to contact me. [Sincerely,] : Sincerely, [FreeTextEntry3] : \par Binu Vázquez M.D., FACS, ECNU\par Director Center for Thyroid & Parathyroid Surgery\par [facility] [place] at [facility]\par Certified in Thyroid/Parathyroid/Neck Ultrasound, ECNU/ AIUM\par \par , Department of Otolaryngology\par [facility] at [facility]\par

## 2022-04-25 ENCOUNTER — TRANSCRIPTION ENCOUNTER (OUTPATIENT)
Age: 42
End: 2022-04-25

## 2023-02-07 ENCOUNTER — APPOINTMENT (OUTPATIENT)
Dept: OTOLARYNGOLOGY | Facility: CLINIC | Age: 43
End: 2023-02-07
Payer: COMMERCIAL

## 2023-02-07 VITALS
DIASTOLIC BLOOD PRESSURE: 90 MMHG | HEART RATE: 89 BPM | BODY MASS INDEX: 23.05 KG/M2 | WEIGHT: 135 LBS | TEMPERATURE: 97.3 F | SYSTOLIC BLOOD PRESSURE: 136 MMHG | OXYGEN SATURATION: 100 % | HEIGHT: 64 IN

## 2023-02-07 DIAGNOSIS — E21.3 HYPERPARATHYROIDISM, UNSPECIFIED: ICD-10-CM

## 2023-02-07 DIAGNOSIS — D35.1 BENIGN NEOPLASM OF PARATHYROID GLAND: ICD-10-CM

## 2023-02-07 DIAGNOSIS — R53.81 CHRONIC FATIGUE, UNSPECIFIED: ICD-10-CM

## 2023-02-07 DIAGNOSIS — R53.82 CHRONIC FATIGUE, UNSPECIFIED: ICD-10-CM

## 2023-02-07 DIAGNOSIS — D44.0 NEOPLASM OF UNCERTAIN BEHAVIOR OF THYROID GLAND: ICD-10-CM

## 2023-02-07 DIAGNOSIS — E83.52 HYPERCALCEMIA: ICD-10-CM

## 2023-02-07 DIAGNOSIS — E89.2 POSTPROCEDURAL HYPOPARATHYROIDISM: ICD-10-CM

## 2023-02-07 DIAGNOSIS — R49.9 UNSPECIFIED VOICE AND RESONANCE DISORDER: ICD-10-CM

## 2023-02-07 PROCEDURE — 99214 OFFICE O/P EST MOD 30 MIN: CPT | Mod: 25

## 2023-02-07 PROCEDURE — 31575 DIAGNOSTIC LARYNGOSCOPY: CPT

## 2023-02-07 NOTE — HISTORY OF PRESENT ILLNESS
[de-identified] : Shefali is a general healthy 39-year-old  with well-documented primary hyperparathyroidism first formally diagnosed last year but with known hypercalcemia in the 11 mg/dl range since 2013.  In July of 2019 she had a calcium level at 11.4 mg/dl with an associated intact PTH of 76.1 pg/ml and then in May of this year her calcium level was measured at 11.0 mg/dl with a PTH at 63.9 pg/ml.  Her vitamin D 25-OH total level was normal. She had a normal bone DEXA study.  A thyroid ultrasound was obtained that was consistent with a left inferior positioned parathyroid adenoma measuring 11 mm.  A right mid-lobe complex thyroid nodule was identified measuring 1.6 CM and an FNA biopsy last August was reported as benign, [place] category II.  She is euthyroid.  A 24-hour urine collection revealed an elevated calcium level at 286 mg per 24 hours. However, she has not had any history of nephrolithiasis and her renal function is normal (GFR >100). Shefali denies recent shortness of breath, voice changes, dysphagia, anterior neck pain, neck pressure or mass. There is no family history of thyroid cancer. She denies any known radiation exposures in her youth.  She denies calcium, vitamin D supplements, HCTZ or past use of Lithium Carbonate. There is no family history of nephrolithiasis or renal disease but her mother currently has primary hyperparathyroidism being observed.  There is no history of fragility bone fractures. Other than fatigue, muscle weakness, generalized bone aches, joint pain, depression, memory loss, brain fog, abdominal pain, occasional constipation, polyuria and polydipsia she denies peptic ulcer disease or pancreatitis. She had GERD in the past managed with Nexium but not needed over the past 6-9 months. She has alopecia areata of uncertain etiology. She was referred for a surgical consultation.\par  [FreeTextEntry1] : Shefali returns for a follow up visit after an uncomplicated neck exploration and parathyroidectomy on 07/22/2020. Her voice is back to baseline. Surgical pathology confirmed a 1.2 CM, 1 g enlarged left inferior parathyroid gland consistent with a parathyroid adenoma.  At the time of surgery she had a normal-appearing left upper parathyroid gland and had an appropriate drop in her intraoperative parathyroid hormone assay greater than 75% from the baseline level.  Her postop Ca/PTH were 10.1/26. Repeat labs were recently sent by her hematologist last month and serum calcium was normal, 9.9  in November, 10.2 in October but slightly elevated last month at 10.5 mg/dl off of calcium but is taking vitamin D3 (SHE DOES NOT KNOW THE DOSE) and her last level was 61.8 last month.  She is treated with iron infusions every 6 months for anemia related to uterine fibroids.  A right mid-lower pole thyroid nodule was benign on FNAB 1.6 cm and monitored. She is euthyroid.  Her last ultrasound of the thyroid gland was dated 2/10/2022 and essentially stable.  The 1.8 cm complex nodule in the right lobe had a larger cystic component when compared to the prior study.  She denies fever, body aches, cough, cyanosis, dysphagia, chest burning, anosmia or recent known COVID exposures. All family members at home are well.  She is vaccinated but not boosted.  She had a period of emesis this past weekend and her voice is slightly raspy.  She had a small amount of blood in her saliva.

## 2023-02-07 NOTE — PROCEDURE
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 There are no masses or polyps and the nasal mucosa and secretions are normal. The choanae and posterior nasopharynx are normal but with prominent lymphoid hyperplasia and what appear to be [place] spots without mass.  The Eustachian tube orifices appear patent. The pharynx, including the posterior and lateral pharyngeal walls, the vallecula and base of tongue are normal without ulcerations, lesions or masses. The hypopharynx including the pyriform sinuses open well without pooling of secretions, mucosal lesions or masses. The supraglottic larynx including the epiglottis, petiole, arytenoids, glossoepiglottic, aryepiglottic and pharyngoepiglottic folds are normal without mucosal lesions, ulcerations or masses. The glottis reveals normal false vocal folds. The true vocal folds are glistening white, tense and of equal length, without paralysis, having symmetric mobility on adduction and abduction. There are no mucosal lesions, nodules, cysts, erythroplasia or leukoplakia. The posterior cricoid area has healthy pink mucosa in the interarytenoid area and esophageal inlet. There is mild thickening/edema of the interarytenoid mucosa suggestive of posterior laryngitis from laryngopharyngeal acid reflux disease. The trachea is clear without narrowing in the immediate subglottic region, without deviation or lesions. \par  [de-identified] : thyroid nodule s/p parathyroidectomy.  Hoarseness after recent emesis.

## 2023-02-07 NOTE — CONSULT LETTER
[Dear  ___] : Dear  [unfilled], [Consult Letter:] : I had the pleasure of evaluating your patient, [unfilled]. [Please see my note below.] : Please see my note below. [Consult Closing:] : Thank you very much for allowing me to participate in the care of this patient.  If you have any questions, please do not hesitate to contact me. [Sincerely,] : Sincerely, [FreeTextEntry3] : \par Binu Vázquez M.D., FACS, ECNU\par Director Center for Thyroid & Parathyroid Surgery\par [facility] [place] at [facility]\par Certified in Thyroid/Parathyroid/Neck Ultrasound, ECNU/ AIUM\par \par , Department of Otolaryngology\par [facility] at [facility]\par

## 2023-02-07 NOTE — REASON FOR VISIT
[FreeTextEntry2] : a followup visit after parathyroidectomy for primary hyperparathyroidism, hypercalcemia, and a possible parathyroid adenoma for surgical consultation. [FreeTextEntry1] : Referred by Domi Sawyer MD Endocrinologist, PCP is Karolina Lee MD, Dermatologist is Efren Carrion MD

## 2023-02-10 LAB
25(OH)D3 SERPL-MCNC: 55.6 NG/ML
ANION GAP SERPL CALC-SCNC: 13 MMOL/L
BUN SERPL-MCNC: 10 MG/DL
CA-I SERPL-SCNC: 5.2 MG/DL
CALCIUM SERPL-MCNC: 9.9 MG/DL
CALCIUM SERPL-MCNC: 9.9 MG/DL
CHLORIDE SERPL-SCNC: 100 MMOL/L
CO2 SERPL-SCNC: 27 MMOL/L
CREAT SERPL-MCNC: 0.73 MG/DL
EGFR: 105 ML/MIN/1.73M2
GLUCOSE SERPL-MCNC: 90 MG/DL
PARATHYROID HORMONE INTACT: 31 PG/ML
POTASSIUM SERPL-SCNC: 4.4 MMOL/L
SODIUM SERPL-SCNC: 140 MMOL/L

## 2023-07-18 ENCOUNTER — NON-APPOINTMENT (OUTPATIENT)
Age: 43
End: 2023-07-18

## 2024-03-07 ENCOUNTER — NON-APPOINTMENT (OUTPATIENT)
Age: 44
End: 2024-03-07

## 2025-08-06 ENCOUNTER — NON-APPOINTMENT (OUTPATIENT)
Age: 45
End: 2025-08-06